# Patient Record
Sex: MALE | Race: WHITE | NOT HISPANIC OR LATINO
[De-identification: names, ages, dates, MRNs, and addresses within clinical notes are randomized per-mention and may not be internally consistent; named-entity substitution may affect disease eponyms.]

---

## 2022-09-22 PROBLEM — Z00.00 ENCOUNTER FOR PREVENTIVE HEALTH EXAMINATION: Status: ACTIVE | Noted: 2022-09-22

## 2022-09-23 ENCOUNTER — APPOINTMENT (OUTPATIENT)
Dept: GASTROENTEROLOGY | Facility: CLINIC | Age: 47
End: 2022-09-23

## 2022-09-23 VITALS
OXYGEN SATURATION: 98 % | TEMPERATURE: 97.5 F | SYSTOLIC BLOOD PRESSURE: 120 MMHG | HEIGHT: 67 IN | WEIGHT: 242.2 LBS | DIASTOLIC BLOOD PRESSURE: 90 MMHG | BODY MASS INDEX: 38.01 KG/M2 | HEART RATE: 68 BPM

## 2022-09-23 DIAGNOSIS — K21.9 GASTRO-ESOPHAGEAL REFLUX DISEASE W/OUT ESOPHAGITIS: ICD-10-CM

## 2022-09-23 DIAGNOSIS — K57.90 DIVERTICULOSIS OF INTESTINE, PART UNSPECIFIED, W/OUT PERFORATION OR ABSCESS W/OUT BLEEDING: ICD-10-CM

## 2022-09-23 DIAGNOSIS — K59.00 CONSTIPATION, UNSPECIFIED: ICD-10-CM

## 2022-09-23 DIAGNOSIS — I10 ESSENTIAL (PRIMARY) HYPERTENSION: ICD-10-CM

## 2022-09-23 PROCEDURE — 99204 OFFICE O/P NEW MOD 45 MIN: CPT

## 2022-09-23 RX ORDER — SUCRALFATE 1 G/1
1 TABLET ORAL 4 TIMES DAILY
Qty: 120 | Refills: 3 | Status: COMPLETED | COMMUNITY
Start: 2022-09-23 | End: 2023-01-21

## 2022-09-23 RX ORDER — SODIUM PICOSULFATE, MAGNESIUM OXIDE, AND ANHYDROUS CITRIC ACID 10; 3.5; 12 MG/160ML; G/160ML; G/160ML
10-3.5-12 MG-GM LIQUID ORAL
Qty: 2 | Refills: 0 | Status: ACTIVE | COMMUNITY
Start: 2022-09-23 | End: 1900-01-01

## 2022-09-23 RX ORDER — ESOMEPRAZOLE MAGNESIUM 40 MG/1
40 CAPSULE, DELAYED RELEASE ORAL
Refills: 0 | Status: ACTIVE | COMMUNITY

## 2022-09-23 RX ORDER — ALUMINUM HYDROXIDE AND MAGNESIUM CARBONATE 95; 358 MG/15ML; MG/15ML
LIQUID ORAL
Refills: 0 | Status: ACTIVE | COMMUNITY

## 2022-09-23 NOTE — PHYSICAL EXAM
[Alert] : alert [No Acute Distress] : no acute distress [Obese (BMI >= 30)] : obese (BMI >= 30) [Normal] : heart rate was normal and rhythm regular, normal S1 and S2, no murmurs [Abdomen Tenderness] : non-tender [de-identified] : There is a midline weakness of the rectus muscle with no tenderness on palpation.

## 2022-09-23 NOTE — HISTORY OF PRESENT ILLNESS
[FreeTextEntry1] : I saw patient Pete Ann the office today.  The patient is a 47-year-old male who has a history of hypertension currently on losartan.  The patient denies a history of diabetes or coronary artery disease and his appetite is good with no dysphagia or unexplained weight loss.  Patient has longstanding reflux for decades.  Patient has been taking a proton pump inhibitor on a daily basis and occasionally couples  that with the famotidine and Gaviscon.  In spite of this the patient has almost daily reflux with especially bothersome nocturnal symptoms.  There is no actual regurgitation.  Patient had a recent esophagram which revealed a hiatus hernia.  The patient also has a history of complicated diverticulitis which required resection.  Since then the patient notices alteration of his bowel habits with his stool is harder causing him to have to strain.  Because of this the patient has daily rectal bleeding with bright red blood dripping into the toilet.  The patient has 1 to 2 cups of caffeine a day rarely has ethanol and does not smoke.  The patient states he tries to eat a fairly healthy diet and does not eat late before retiring.  The patient, however, has gained a considerable amount of weight.  The patient also notices bulging in the anterior abdominal wall which does not cause him any discomfort.  The patient stated he had a colonoscopy and endoscopy done more than 20 years ago.  Pete  does have a family history of colon polyps.

## 2022-09-23 NOTE — ASSESSMENT
[FreeTextEntry1] : Mr. Ann is a 47-year-old male with hypertension.  The patient suffers from several gastrointestinal issues which include chronic reflux which is requiring acid suppression for over 20 years.  Patient is taking both the Nexium and the famotidine at bedtime and still gets breakthrough symptoms.  There is the possibility of the patient is having bile reflux as well.  Longevity of the symptoms dictate that the patient has an upper endoscopy to assure he has not developed any esophageal damage or Molina's esophagus.  The patient was instructed to take the Nexium in the morning and use Carafate before each meal in the event that we are dealing with bile reflux.  The patient also has constipation after his diverticular resection his symptoms are not suggestive of a stricture at the anastomosis but he is due for repeat colonoscopy.  Both procedures will be performed the same day.  Once they are performed I will distribute a copy of the results.  Obviously weight reduction would be very helpful for the patient for his general health as well as for his reflux and  the small ventral hernia noted on today's exam.

## 2022-09-23 NOTE — REVIEW OF SYSTEMS
[As Noted in HPI] : as noted in HPI [Constipation] : constipation [Heartburn] : heartburn [Bleeding] : bleeding [Negative] : Genitourinary

## 2022-11-29 DIAGNOSIS — Z20.822 ENCOUNTER FOR PREPROCEDURAL LABORATORY EXAMINATION: ICD-10-CM

## 2022-11-29 DIAGNOSIS — Z01.812 ENCOUNTER FOR PREPROCEDURAL LABORATORY EXAMINATION: ICD-10-CM

## 2023-01-03 ENCOUNTER — APPOINTMENT (OUTPATIENT)
Dept: GASTROENTEROLOGY | Facility: AMBULATORY MEDICAL SERVICES | Age: 48
End: 2023-01-03
Payer: COMMERCIAL

## 2023-01-03 PROCEDURE — 45378 DIAGNOSTIC COLONOSCOPY: CPT | Mod: PT

## 2023-01-03 PROCEDURE — 43239 EGD BIOPSY SINGLE/MULTIPLE: CPT | Mod: 59

## 2023-01-03 RX ORDER — FAMOTIDINE 40 MG/1
40 TABLET, FILM COATED ORAL DAILY
Qty: 90 | Refills: 3 | Status: ACTIVE | COMMUNITY
Start: 1900-01-01 | End: 1900-01-01